# Patient Record
Sex: MALE
[De-identification: names, ages, dates, MRNs, and addresses within clinical notes are randomized per-mention and may not be internally consistent; named-entity substitution may affect disease eponyms.]

---

## 2023-08-17 ENCOUNTER — APPOINTMENT (OUTPATIENT)
Dept: OTOLARYNGOLOGY | Facility: CLINIC | Age: 88
End: 2023-08-17
Payer: MEDICARE

## 2023-08-17 DIAGNOSIS — H92.03 OTALGIA, BILATERAL: ICD-10-CM

## 2023-08-17 DIAGNOSIS — H91.93 UNSPECIFIED HEARING LOSS, BILATERAL: ICD-10-CM

## 2023-08-17 DIAGNOSIS — H61.23 IMPACTED CERUMEN, BILATERAL: ICD-10-CM

## 2023-08-17 DIAGNOSIS — F17.290 NICOTINE DEPENDENCE, OTHER TOBACCO PRODUCT, UNCOMPLICATED: ICD-10-CM

## 2023-08-17 DIAGNOSIS — Z82.2 FAMILY HISTORY OF DEAFNESS AND HEARING LOSS: ICD-10-CM

## 2023-08-17 DIAGNOSIS — H91.90 UNSPECIFIED HEARING LOSS, UNSPECIFIED EAR: ICD-10-CM

## 2023-08-17 DIAGNOSIS — H93.8X3 OTHER SPECIFIED DISORDERS OF EAR, BILATERAL: ICD-10-CM

## 2023-08-17 DIAGNOSIS — Z87.2 PERSONAL HISTORY OF DISEASES OF THE SKIN AND SUBCUTANEOUS TISSUE: ICD-10-CM

## 2023-08-17 PROBLEM — Z00.00 ENCOUNTER FOR PREVENTIVE HEALTH EXAMINATION: Status: ACTIVE | Noted: 2023-08-17

## 2023-08-17 PROCEDURE — 69210 REMOVE IMPACTED EAR WAX UNI: CPT

## 2023-08-17 PROCEDURE — 92567 TYMPANOMETRY: CPT

## 2023-08-17 PROCEDURE — 99203 OFFICE O/P NEW LOW 30 MIN: CPT | Mod: 25

## 2023-08-17 PROCEDURE — 92557 COMPREHENSIVE HEARING TEST: CPT

## 2023-08-17 RX ORDER — ATORVASTATIN CALCIUM 80 MG/1
TABLET, FILM COATED ORAL
Refills: 0 | Status: ACTIVE | COMMUNITY

## 2023-08-17 RX ORDER — MELOXICAM 10 MG/1
10 CAPSULE ORAL
Refills: 0 | Status: ACTIVE | COMMUNITY

## 2023-08-17 NOTE — REASON FOR VISIT
[Initial Evaluation] : an initial evaluation for [Formal Caregiver] : formal caregiver [FreeTextEntry2] : Persistent clogged ears and change in hearing for the past couple of months.  Patient states his level of severity is a level 6 out of 10 and it occurs constant.  Patient states nothing helps to improve or worsens his hearing

## 2023-08-17 NOTE — HISTORY OF PRESENT ILLNESS
[de-identified] : 89 years old male patient with history of Persistent clogged ears and change in hearing for the past couple of months.   Patient is present today in the office with bilateral cerumen impaction.  Bilateral Sensorineural Hearing Loff

## 2023-08-17 NOTE — PROCEDURE
[[facility]] : [facility] [Cer[facility] Impaction] : Cerumen Impaction [] : Removal of Cerumen [FreeTextEntry5] : Garcia suction #5, Ear curette, Ear Alligator

## 2023-08-17 NOTE — PHYSICAL EXAM
[de-identified] : SNHL and imp cerumen removed AU [Normal] : mucosa is normal [Midline] : trachea located in midline position